# Patient Record
Sex: FEMALE
[De-identification: names, ages, dates, MRNs, and addresses within clinical notes are randomized per-mention and may not be internally consistent; named-entity substitution may affect disease eponyms.]

---

## 2018-07-23 ENCOUNTER — HOSPITAL ENCOUNTER (INPATIENT)
Dept: HOSPITAL 92 - ERS | Age: 83
LOS: 3 days | Discharge: NURSING, REHAB OR LONG-TERM CARE | DRG: 482 | End: 2018-07-26
Attending: SURGERY | Admitting: ORTHOPAEDIC SURGERY
Payer: MEDICARE

## 2018-07-23 VITALS — BODY MASS INDEX: 25.3 KG/M2

## 2018-07-23 DIAGNOSIS — I25.10: ICD-10-CM

## 2018-07-23 DIAGNOSIS — F02.80: ICD-10-CM
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
## 2018-07-23 DIAGNOSIS — S72.302A: Primary | ICD-10-CM

## 2018-07-23 DIAGNOSIS — I25.2: ICD-10-CM

## 2018-07-23 DIAGNOSIS — I10: ICD-10-CM

## 2018-07-23 DIAGNOSIS — H40.9: ICD-10-CM

## 2018-07-23 DIAGNOSIS — Z95.5: ICD-10-CM

## 2018-07-23 DIAGNOSIS — G30.9: ICD-10-CM

## 2018-07-23 DIAGNOSIS — E11.9: ICD-10-CM
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
## 2018-07-23 LAB
ALBUMIN SERPL BCG-MCNC: 4.1 G/DL (ref 3.4–4.8)
ALP SERPL-CCNC: 91 U/L (ref 40–150)
ALT SERPL W P-5'-P-CCNC: 13 U/L (ref 8–55)
ANION GAP SERPL CALC-SCNC: 14 MMOL/L (ref 10–20)
APTT PPP: 23.8 SEC (ref 22.9–36.1)
AST SERPL-CCNC: 24 U/L (ref 5–34)
BASOPHILS # BLD AUTO: 0.1 THOU/UL (ref 0–0.2)
BASOPHILS NFR BLD AUTO: 0.6 % (ref 0–1)
BILIRUB SERPL-MCNC: 0.5 MG/DL (ref 0.2–1.2)
BUN SERPL-MCNC: 32 MG/DL (ref 9.8–20.1)
CALCIUM SERPL-MCNC: 9.1 MG/DL (ref 7.8–10.44)
CHLORIDE SERPL-SCNC: 107 MMOL/L (ref 98–107)
CK SERPL-CCNC: 276 U/L (ref 29–168)
CO2 SERPL-SCNC: 23 MMOL/L (ref 23–31)
CREAT CL PREDICTED SERPL C-G-VRATE: 0 ML/MIN (ref 70–130)
EOSINOPHIL # BLD AUTO: 0.1 THOU/UL (ref 0–0.7)
EOSINOPHIL NFR BLD AUTO: 1.3 % (ref 0–10)
GLOBULIN SER CALC-MCNC: 3.3 G/DL (ref 2.4–3.5)
GLUCOSE SERPL-MCNC: 108 MG/DL (ref 83–110)
HGB BLD-MCNC: 12.9 G/DL (ref 12–16)
INR PPP: 1.1
LYMPHOCYTES # BLD: 2 THOU/UL (ref 1.2–3.4)
LYMPHOCYTES NFR BLD AUTO: 18.2 % (ref 21–51)
MCH RBC QN AUTO: 31.4 PG (ref 27–31)
MCV RBC AUTO: 94.1 FL (ref 78–98)
MONOCYTES # BLD AUTO: 0.6 THOU/UL (ref 0.11–0.59)
MONOCYTES NFR BLD AUTO: 5.9 % (ref 0–10)
NEUTROPHILS # BLD AUTO: 8 THOU/UL (ref 1.4–6.5)
NEUTROPHILS NFR BLD AUTO: 74 % (ref 42–75)
PLATELET # BLD AUTO: 270 THOU/UL (ref 130–400)
POTASSIUM SERPL-SCNC: 4.3 MMOL/L (ref 3.5–5.1)
PROTHROMBIN TIME: 13.9 SEC (ref 12–14.7)
RBC # BLD AUTO: 4.12 MILL/UL (ref 4.2–5.4)
SODIUM SERPL-SCNC: 140 MMOL/L (ref 136–145)
WBC # BLD AUTO: 10.8 THOU/UL (ref 4.8–10.8)

## 2018-07-23 PROCEDURE — 71045 X-RAY EXAM CHEST 1 VIEW: CPT

## 2018-07-23 PROCEDURE — 0QH936Z INSERTION OF INTRAMEDULLARY INTERNAL FIXATION DEVICE INTO LEFT FEMORAL SHAFT, PERCUTANEOUS APPROACH: ICD-10-PCS | Performed by: ORTHOPAEDIC SURGERY

## 2018-07-23 PROCEDURE — 72170 X-RAY EXAM OF PELVIS: CPT

## 2018-07-23 PROCEDURE — 27502 TREATMENT OF THIGH FRACTURE: CPT

## 2018-07-23 PROCEDURE — 72125 CT NECK SPINE W/O DYE: CPT

## 2018-07-23 PROCEDURE — 85025 COMPLETE CBC W/AUTO DIFF WBC: CPT

## 2018-07-23 PROCEDURE — C1769 GUIDE WIRE: HCPCS

## 2018-07-23 PROCEDURE — 36415 COLL VENOUS BLD VENIPUNCTURE: CPT

## 2018-07-23 PROCEDURE — 51702 INSERT TEMP BLADDER CATH: CPT

## 2018-07-23 PROCEDURE — G0390 TRAUMA RESPONS W/HOSP CRITI: HCPCS

## 2018-07-23 PROCEDURE — 86850 RBC ANTIBODY SCREEN: CPT

## 2018-07-23 PROCEDURE — C1713 ANCHOR/SCREW BN/BN,TIS/BN: HCPCS

## 2018-07-23 PROCEDURE — 76001: CPT

## 2018-07-23 PROCEDURE — 85610 PROTHROMBIN TIME: CPT

## 2018-07-23 PROCEDURE — 80053 COMPREHEN METABOLIC PANEL: CPT

## 2018-07-23 PROCEDURE — 70450 CT HEAD/BRAIN W/O DYE: CPT

## 2018-07-23 PROCEDURE — 86900 BLOOD TYPING SEROLOGIC ABO: CPT

## 2018-07-23 PROCEDURE — 85730 THROMBOPLASTIN TIME PARTIAL: CPT

## 2018-07-23 PROCEDURE — 93005 ELECTROCARDIOGRAM TRACING: CPT

## 2018-07-23 PROCEDURE — 96374 THER/PROPH/DIAG INJ IV PUSH: CPT

## 2018-07-23 PROCEDURE — 86901 BLOOD TYPING SEROLOGIC RH(D): CPT

## 2018-07-23 RX ADMIN — DORZOLAMIDE HYDROCHLORIDE AND TIMOLOL MALEATE SCH DROP: 22.3; 6.8 SOLUTION/ DROPS OPHTHALMIC at 20:23

## 2018-07-23 RX ADMIN — Medication SCH TAB: at 20:28

## 2018-07-23 RX ADMIN — IPRATROPIUM BROMIDE SCH: 21 SPRAY NASAL at 20:24

## 2018-07-23 NOTE — CON
DATE OF CONSULTATION:  07/23/2018

 

HISTORY OF PRESENT ILLNESS:  We were asked by the Emergency Room and Trauma to see the patient.  The 
patient was living at home with her  and family.  When the  came in to use the restroom
, found the patient on the ground.  The family feels that the patient may have fallen out of bed.  Th
e patient does have dementia, Alzheimer's, but is able to answer questions fairly well.  She does hav
e a fairly significant amount of pain in that left lower extremity.  She is currently in an immobiliz
ation, a half-ring splint.  Any time we moved this or try and loosen it, she is in quite a bit of dash
n.  She denies any numbness or tingling into her extremities and can wiggle her toes and follow comma
nds.  Family is unsure if there is any loss of consciousness.  Patient does not recall hitting her he
ad nor does she have any bruising or lumps or bumps to the head or scalp or face.

 

PAST MEDICAL HISTORY:  Positive for glaucoma; coronary artery disease with an MI, treated with stents
; diabetes, type 2; hypertension; Alzheimer's.

 

PAST SURGICAL HISTORY:  Knee meniscectomy, appendectomy, cataracts, and stents.

 

SOCIAL HISTORY:  No alcohol or nicotine products.  Resides at home with family.

 

ALLERGIES:  CODEINE.

 

CURRENT MEDICATIONS:  Per list is Fosamax, Ecotrin, calcium carbonate with vitamin D, Zyrtec, Dulcola
x stool softeners, Aricept 5 and 10 mg, Cosopt eye drops for glaucoma, Atrovent, Avapro, Lamictal, Na
menda, metformin, Toprol-XL, Nexium, simvastatin, and recently started mirtazapine for diet.

 

REVIEW OF SYSTEMS:  The patient is very sweet.  Because we did not move her legs, she is in no acute 
distress.  Her only complaint currently is that left leg pain.  Denies any numbness and tingling in t
he lower extremities.  Denies any chest pain or any shortness of breath.  Rest of review of systems n
egative.

 

PHYSICAL EXAMINATION:

GENERAL:  A well-nourished female, resting on a gurney in bed 21 in the emergency room, currently in 
a cervical collar.  Speech is clear and she is answering simple questions okay.  Family is giving us 
a history more so than the patient.

HEENT:  Normal exam.  Face symmetric, tongue midline.

NECK:  In a cervical collar.

EXTREMITIES:  Upper extremities, other than some bruising on her arms, she is moving these fairly equ
ally and they are in equal size, shape, symmetry, normal bulk and tone.  Lower extremities, right low
er extremity normal exam.  Left lower extremity is in an immobilization traction splint.  Palpation o
r movement of that leg causes her some significant pain, but she has good, equal, symmetric DP and PT
 pulses, and is moving both of her foot and digits well.

 

IMAGING:  X-rays show a left femur fracture, approximately mid shaft.

 

LABORATORY RESULTS:  CBC within normal limits.  Coags normal.  Chemistries:  BUN is 32.  Creatinine 1
.37, a little bit elevated.  Creatine kinase is also elevated to 76.

 

ASSESSMENT:

1.  Fall with ensuing left femur fracture.

2.  Multiple health issues.

3.  Alzheimer's.

4.  Hypertension

5.  Diabetes.

 

PLAN:  I spoke extensively with the family.  Plan would be to fix her femur today and do an antegrade
 nail.  I have explained the procedure, the patient's risks and benefits.  The patient and family at 
the bedside is amenable to go forth with the procedure.  We need medical clearance from trauma.  Once
 we get that, we will proceed forth with the surgery.  She is getting a Tejeda catheter inserted, derrick de los santos will help, get some fentanyl for some pain.  We will get her consented.  Hopefully, get her on the 
surgery schedule this afternoon.  Her last time she ate was yesterday evening per family.

 

Javed Brooks PA-C dictating for Sabino Smith M.D.

## 2018-07-23 NOTE — RAD
TWO VIEWS LEFT FEMUR:

 

History: Trauma with left femur pain. 

 

FINDINGS: 

There is a comminuted midshaft left femur fracture with displacement of the distal fragment slightly 
medially one-half shaft width. There is valgus malalignment at the fracture site. There is moderate d
egenerative arthrosis of the visualized left knee. There is mild degenerative arthrosis of the left h
ip. 

 

IMPRESSION: 

Angulated mildly displaced left midshaft femur fracture. 

 

POS: [facility]

## 2018-07-23 NOTE — RAD
AP PELVIS:

 

Indication: History of trauma with pelvic pain. 

 

FINDINGS: 

There is suspected hernia mesh involving the lower aspect of the pelvis. There is scattered degenerat
alvaro and osteoarthritic change. Enthesopathic change seen off the proximal femurs and pelvis. There ar
e prominent vascular calcifications within the adjacent soft tissues. 

 

IMPRESSION: 

No acute osseous abnormality. 

 

POS: [facility]

## 2018-07-23 NOTE — RAD
AP CHEST:

 

Indication: History of trauma and chest pain. 

 

Comparison: 3-6-

 

FINDINGS: 

Chronic lung change with mild cardiomegaly is stable. There is an expected skin fold within the upper
 thorax. No definite pneumothorax is evident. No definite acute osseous abnormality is evident. 

 

IMPRESSION: 

1.  Stable chronic lung changes of cardiomegaly. 

2. This line traversing the upper thorax may be related to skin fold in the back. No definite pneumot
horax, contusion or pleural effusion is demonstrated. 

 

[facility]: [facility]

## 2018-07-23 NOTE — OP
DATE OF SURGERY:  07/23/2018

 

PREOPERATIVE DIAGNOSIS:  Left femoral shaft fracture.

 

POSTOPERATIVE DIAGNOSIS:  Left femoral shaft fracture.

 

SURGICAL PROCEDURE:  Intramedullary nail stabilization of left femur.

 

ANESTHESIA:  General.

 

SURGEON:  Sabino Smith MD

 

ASSISTANT:  Javed Brooks PA-C

 

IMPLANTS:  Synthes X nail measuring 10 x 360 mm with two cross lock screws.

 

COMPLICATIONS:  None.

 

DRAINS:  None.

 

SPECIMEN:  None.

 

OUTCOME:  Near anatomic alignment.

 

INDICATIONS:  The patient is an 84-year-old lady found down with femoral shaft fracture.  After discu
ssion with patient and her family including risks and benefits, we decided to proceed with intramedul
linda stabilization for this midshaft fracture.  Informed consent has been obtained.  I believe all qu
estions were answered.

 

PROCEDURE IN DETAIL:  The patient was brought to the operating room and a timeout performed followed 
by induction of general anesthesia.  The patient was then positioned supine on the fracture table wit
h two legs scissored to allow for AP lateral C-arm imaging of the left femur.  A sterile prep and kat
pe was then performed of the left thigh.  Next, a small incision was made initially proximal to the t
ip of the greater trochanter this being too proximal and as such a second incision was made just to t
he tip of the greater trochanter and then blunt dissection carried down to the tip of the troch, a th
readed guidewire was then passed from the medial side of the trochanter into the proximal femur canal
.  Once appropriately positioned, a reamer was passed over this guidewire to open the canal.  Next, a
 ball-tip guidewire was passed down the canal across the fracture in the distal femoral metaphysis.  
This was then followed by reaming, starting at 8.5 and continuing up to 11.  Once reamed a 10 x 360-m
m nail was passed over the guidewire, once appropriately positioned, a single distal cross lock screw
 was placed in freehand technique from lateral to medial.  The fracture was then backslapped to try a
nd get compression and then a single dynamic screw was placed proximally through the jig.  The jig wa
s then removed from the nail and then AP, lateral C-arm images were obtained.  The two proximal wound
s were irrigated with normal saline and closed in layers with 2-0 Vicryl and staples.  The two small 
stab wounds for cross lock screws were closed with staples.  Xeroform gauze tape dressing was applied
 to the thigh wounds and then the patient was transferred to recovery room in stable condition.  Ther
e were no complications.  She tolerated the procedure well.

## 2018-07-23 NOTE — RAD
THREE VIEWS LEFT SHOULDER:

 

Indication: Left shoulder pain and trauma. 

 

Comparison: None. 

 

FINDINGS: 

There is some mild glenohumeral and AC joint osteoarthrosis. There is diffuse osteopenia. Visualized 
left lung is clear. 

 

IMPRESSION: 

No acute osseous abnormality. 

 

POS: [facility]

## 2018-07-24 LAB
BASOPHILS # BLD AUTO: 0 THOU/UL (ref 0–0.2)
BASOPHILS NFR BLD AUTO: 0.3 % (ref 0–1)
EOSINOPHIL # BLD AUTO: 0 THOU/UL (ref 0–0.7)
EOSINOPHIL NFR BLD AUTO: 0.2 % (ref 0–10)
HGB BLD-MCNC: 11.2 G/DL (ref 12–16)
LYMPHOCYTES # BLD: 1.8 THOU/UL (ref 1.2–3.4)
LYMPHOCYTES NFR BLD AUTO: 18.4 % (ref 21–51)
MCH RBC QN AUTO: 30.6 PG (ref 27–31)
MCV RBC AUTO: 93.9 FL (ref 78–98)
MONOCYTES # BLD AUTO: 0.8 THOU/UL (ref 0.11–0.59)
MONOCYTES NFR BLD AUTO: 8.6 % (ref 0–10)
NEUTROPHILS # BLD AUTO: 7.1 THOU/UL (ref 1.4–6.5)
NEUTROPHILS NFR BLD AUTO: 72.5 % (ref 42–75)
PLATELET # BLD AUTO: 250 THOU/UL (ref 130–400)
RBC # BLD AUTO: 3.66 MILL/UL (ref 4.2–5.4)
WBC # BLD AUTO: 9.8 THOU/UL (ref 4.8–10.8)

## 2018-07-24 RX ADMIN — DORZOLAMIDE HYDROCHLORIDE AND TIMOLOL MALEATE SCH DROP: 22.3; 6.8 SOLUTION/ DROPS OPHTHALMIC at 08:55

## 2018-07-24 RX ADMIN — IPRATROPIUM BROMIDE SCH SPR: 21 SPRAY NASAL at 08:55

## 2018-07-24 RX ADMIN — DORZOLAMIDE HYDROCHLORIDE AND TIMOLOL MALEATE SCH DROP: 22.3; 6.8 SOLUTION/ DROPS OPHTHALMIC at 20:08

## 2018-07-24 RX ADMIN — Medication SCH TAB: at 20:11

## 2018-07-24 RX ADMIN — IPRATROPIUM BROMIDE SCH: 21 SPRAY NASAL at 20:08

## 2018-07-24 RX ADMIN — Medication SCH TAB: at 08:54

## 2018-07-24 NOTE — RAD
INTRAOPERATIVE FLUOROSCOPIC IMAGES OF THE LEFT FEMUR:

 

Date: 7-23-18

 

Comparison: 7-23-18

 

FINDINGS: 

AP and lateral intraoperative fluoroscopic images of the left femur are submitted for interpretation.
 

 

There is an intramedullary mirta with proximal distal interlocking screws transfixing the previously se
en fracture involving the mid diaphysis of the left femur. There is improvement in alignment of the f
racture fragments. No hardware complication is seen. Vascular calcifications are seen in the femoral 
arteries as well as popliteal artery. There is osteoarthritis involving the left knee. Surgical clips
 overlie the left superior pubic ramus. 

 

IMPRESSION: 

Post-surgical change related to internal fixation of fracture left femoral diaphysis. 

 

POS: [facility]

## 2018-07-25 LAB
BASOPHILS # BLD AUTO: 0.1 THOU/UL (ref 0–0.2)
BASOPHILS NFR BLD AUTO: 0.6 % (ref 0–1)
EOSINOPHIL # BLD AUTO: 0.2 THOU/UL (ref 0–0.7)
EOSINOPHIL NFR BLD AUTO: 2.1 % (ref 0–10)
HGB BLD-MCNC: 9 G/DL (ref 12–16)
LYMPHOCYTES # BLD: 2.3 THOU/UL (ref 1.2–3.4)
LYMPHOCYTES NFR BLD AUTO: 23.8 % (ref 21–51)
MCH RBC QN AUTO: 32.7 PG (ref 27–31)
MCV RBC AUTO: 94.2 FL (ref 78–98)
MONOCYTES # BLD AUTO: 1 THOU/UL (ref 0.11–0.59)
MONOCYTES NFR BLD AUTO: 10.3 % (ref 0–10)
NEUTROPHILS # BLD AUTO: 6 THOU/UL (ref 1.4–6.5)
NEUTROPHILS NFR BLD AUTO: 63.1 % (ref 42–75)
PLATELET # BLD AUTO: 202 THOU/UL (ref 130–400)
RBC # BLD AUTO: 2.74 MILL/UL (ref 4.2–5.4)
WBC # BLD AUTO: 9.5 THOU/UL (ref 4.8–10.8)

## 2018-07-25 RX ADMIN — Medication SCH TAB: at 09:55

## 2018-07-25 RX ADMIN — DORZOLAMIDE HYDROCHLORIDE AND TIMOLOL MALEATE SCH DROP: 22.3; 6.8 SOLUTION/ DROPS OPHTHALMIC at 09:55

## 2018-07-25 RX ADMIN — DORZOLAMIDE HYDROCHLORIDE AND TIMOLOL MALEATE SCH DROP: 22.3; 6.8 SOLUTION/ DROPS OPHTHALMIC at 21:01

## 2018-07-25 RX ADMIN — Medication SCH: at 22:51

## 2018-07-25 RX ADMIN — Medication SCH TAB: at 20:58

## 2018-07-25 RX ADMIN — IPRATROPIUM BROMIDE SCH: 21 SPRAY NASAL at 21:01

## 2018-07-25 RX ADMIN — IPRATROPIUM BROMIDE SCH: 21 SPRAY NASAL at 09:54

## 2018-07-26 VITALS — TEMPERATURE: 97.5 F | SYSTOLIC BLOOD PRESSURE: 151 MMHG | DIASTOLIC BLOOD PRESSURE: 75 MMHG

## 2018-07-26 LAB
BASOPHILS # BLD AUTO: 0.1 THOU/UL (ref 0–0.2)
BASOPHILS NFR BLD AUTO: 0.7 % (ref 0–1)
EOSINOPHIL # BLD AUTO: 0.3 THOU/UL (ref 0–0.7)
EOSINOPHIL NFR BLD AUTO: 2.6 % (ref 0–10)
HGB BLD-MCNC: 9.8 G/DL (ref 12–16)
LYMPHOCYTES # BLD: 2.2 THOU/UL (ref 1.2–3.4)
LYMPHOCYTES NFR BLD AUTO: 21.6 % (ref 21–51)
MCH RBC QN AUTO: 31.5 PG (ref 27–31)
MCV RBC AUTO: 94.5 FL (ref 78–98)
MONOCYTES # BLD AUTO: 1.1 THOU/UL (ref 0.11–0.59)
MONOCYTES NFR BLD AUTO: 10.5 % (ref 0–10)
NEUTROPHILS # BLD AUTO: 6.6 THOU/UL (ref 1.4–6.5)
NEUTROPHILS NFR BLD AUTO: 64.7 % (ref 42–75)
PLATELET # BLD AUTO: 247 THOU/UL (ref 130–400)
RBC # BLD AUTO: 3.09 MILL/UL (ref 4.2–5.4)
WBC # BLD AUTO: 10.2 THOU/UL (ref 4.8–10.8)

## 2018-07-26 RX ADMIN — IPRATROPIUM BROMIDE SCH: 21 SPRAY NASAL at 08:59

## 2018-07-26 RX ADMIN — Medication SCH TAB: at 08:58

## 2018-07-26 RX ADMIN — DORZOLAMIDE HYDROCHLORIDE AND TIMOLOL MALEATE SCH DROP: 22.3; 6.8 SOLUTION/ DROPS OPHTHALMIC at 08:58

## 2018-07-28 NOTE — EKG
Test Reason : 

Blood Pressure : ***/*** mmHG

Vent. Rate : 065 BPM     Atrial Rate : 065 BPM

   P-R Int : 170 ms          QRS Dur : 094 ms

    QT Int : 416 ms       P-R-T Axes : 000 008 -20 degrees

   QTc Int : 432 ms

 

Normal sinus rhythm

Cannot rule out Inferior infarct , age undetermined

T wave abnormality, consider lateral ischemia

Abnormal ECG

 

Confirmed by NEHA MASTERS DO ([id number]),  RAJAN DUNN ([id number]) on 7/28/2018 11:29:03 AM

 

Referred By:             Confirmed By:NEHA MASTERS DO

## 2018-08-27 NOTE — CT
CT CERVICAL SPINE WITHOUT CONTRAST:

 

Indication: History of fall with left femur fracture and neck pain. 

 

Comparison: None. 

 

FINDINGS: 

There is mild multilevel spondylosis of the cervical spine. No acute fracture or subluxation is evide
nt. Craniocervical junction appears within normal limits. Prevertebral soft tissues are normal. Lung 
apices are clear. 

 

IMPRESSION: 

No acute osseous abnormality. Findings concerning CT of the brain and cervical spine were called to [facility] at 11:38 a.m. 7-23-18. Code QUE

 

POS: [facility]
CT OF THE BRAIN WITHOUT CONTRAST:

 

Indication: Level II trauma. History of all with left femur fracture. 

 

Comparison: None. 

 

FINDINGS: 

No acute infarct, hemorrhage, or hydrocephalus is present. 

 

IMPRESSION: 

No acute intracranial abnormality. 

 

POS: [facility]
No pertinent family history